# Patient Record
Sex: FEMALE | Race: BLACK OR AFRICAN AMERICAN | NOT HISPANIC OR LATINO
[De-identification: names, ages, dates, MRNs, and addresses within clinical notes are randomized per-mention and may not be internally consistent; named-entity substitution may affect disease eponyms.]

---

## 2018-09-25 ENCOUNTER — IMAGING SERVICES (OUTPATIENT)
Dept: OTHER | Age: 48
End: 2018-09-25

## 2018-09-25 ENCOUNTER — HOSPITAL (OUTPATIENT)
Dept: OTHER | Age: 48
End: 2018-09-25

## 2018-11-12 ENCOUNTER — HOSPITAL (OUTPATIENT)
Dept: OTHER | Age: 48
End: 2018-11-12

## 2021-08-12 ENCOUNTER — OFFICE VISIT (OUTPATIENT)
Dept: SURGERY | Facility: CLINIC | Age: 51
End: 2021-08-12
Payer: COMMERCIAL

## 2021-08-12 VITALS
HEIGHT: 62.6 IN | WEIGHT: 205.63 LBS | BODY MASS INDEX: 36.89 KG/M2 | SYSTOLIC BLOOD PRESSURE: 135 MMHG | OXYGEN SATURATION: 97 % | DIASTOLIC BLOOD PRESSURE: 85 MMHG | HEART RATE: 80 BPM

## 2021-08-12 DIAGNOSIS — E66.9 OBESITY (BMI 30-39.9): ICD-10-CM

## 2021-08-12 DIAGNOSIS — R63.5 WEIGHT GAIN: ICD-10-CM

## 2021-08-12 DIAGNOSIS — E06.3 HASHIMOTO'S THYROIDITIS: ICD-10-CM

## 2021-08-12 DIAGNOSIS — K59.00 CONSTIPATION, UNSPECIFIED CONSTIPATION TYPE: ICD-10-CM

## 2021-08-12 DIAGNOSIS — R73.03 PREDIABETES: Primary | ICD-10-CM

## 2021-08-12 DIAGNOSIS — E55.9 VITAMIN D DEFICIENCY: ICD-10-CM

## 2021-08-12 DIAGNOSIS — Z86.59 HISTORY OF DEPRESSION: ICD-10-CM

## 2021-08-12 DIAGNOSIS — R53.83 FATIGUE, UNSPECIFIED TYPE: ICD-10-CM

## 2021-08-12 DIAGNOSIS — R63.2 BINGE EATING: ICD-10-CM

## 2021-08-12 PROCEDURE — 3075F SYST BP GE 130 - 139MM HG: CPT | Performed by: NURSE PRACTITIONER

## 2021-08-12 PROCEDURE — 3008F BODY MASS INDEX DOCD: CPT | Performed by: NURSE PRACTITIONER

## 2021-08-12 PROCEDURE — 99204 OFFICE O/P NEW MOD 45 MIN: CPT | Performed by: NURSE PRACTITIONER

## 2021-08-12 PROCEDURE — 3079F DIAST BP 80-89 MM HG: CPT | Performed by: NURSE PRACTITIONER

## 2021-08-12 RX ORDER — ERGOCALCIFEROL 1.25 MG/1
CAPSULE ORAL
COMMUNITY
Start: 2021-08-03

## 2021-08-12 RX ORDER — LEVOTHYROXINE SODIUM 125 UG/1
TABLET ORAL
COMMUNITY
Start: 2021-07-31

## 2021-08-12 RX ORDER — HYDROCHLOROTHIAZIDE 12.5 MG/1
12.5 TABLET ORAL AS NEEDED
Qty: 30 TABLET | Refills: 1 | Status: SHIPPED | OUTPATIENT
Start: 2021-08-12 | End: 2021-08-12

## 2021-08-12 RX ORDER — HYDROCHLOROTHIAZIDE 12.5 MG/1
TABLET ORAL
Qty: 90 TABLET | Refills: 0 | Status: SHIPPED | OUTPATIENT
Start: 2021-08-12

## 2021-08-12 RX ORDER — TOPIRAMATE 25 MG/1
25 TABLET ORAL DAILY
Qty: 30 TABLET | Refills: 2 | Status: SHIPPED | OUTPATIENT
Start: 2021-08-12

## 2021-08-12 RX ORDER — LIOTHYRONINE SODIUM 5 UG/1
5 TABLET ORAL DAILY
COMMUNITY
Start: 2021-07-31

## 2021-08-12 NOTE — PATIENT INSTRUCTIONS
[facility]:  How a Doctor Cured her Autoimmune Disease with Functional Medicine  Jia Wong  Consider Gluten's role in your Hashimoto's     Fooducate    Magnesium glycinate 200 to 500 mg/day. [facility], [facility], [place], [place].     A a day with 1-2 healthy snacks. 7. Plan when, where, and what you will eat at each meal in advance to make sure you do not go too many hours without eating. 8. Include lean protein throughout the day to help steady your appetite.    9. Eat at least 4 ser

## 2021-08-12 NOTE — PROGRESS NOTES
[facility] Consultation Note       Date of Consult:  2021    Patient:  Kai Hunter  :      1970  MRN:      [id number]    Referring Provider: Patient of clinic     Chief Complaint:  Patient presents with:  Consult: Ruben Irene TABLET BY MOUTH FIRST THING IN THE MORNING 1 HOUR PRIOR TO BREAKFAST EVERY DAY     • Liothyronine Sodium 5 MCG Oral Tab Take 5 mcg by mouth daily.      • ergocalciferol 1.25 MG (86733 UT) Oral Cap TAKE 1 CAPSULE BY MOUTH 1 TIME EVERY WEEK     • HYDROCHLOROT Laterality Date   • CHOLECYSTECTOMY     • OTHER      liposuction    • REDUCTION OF LARGE BREAST     • TUBAL LIGATION         Family History:    Family History   Problem Relation Age of Onset   • Obesity Sister         s/p gastric sleeve           Typical D carotid bruit, no JVD, supple, symmetrical, trachea midline and thyroid not enlarged, symmetric, no tenderness/mass/nodules  Lungs: clear to auscultation bilaterally  Heart: S1, S2 normal, no murmur, click, rub or gallop, regular rate and rhythm  Abdomen: Future  -     TSH+FREE T4; Future  -     VITAMIN D; Future  -     VITAMIN B12; Future    Vitamin D deficiency  -     DIETITIAN EDUCATION INITIAL, DIET (INTERNAL)  -     EKG 12-LEAD; Future  -     COMP METABOLIC PANEL (14);  Future  -     LEPTIN, SERUM; Futu alcohol consumption, reduced sodium intake to no more than 2,400 mg/day, and at least 150 minutes of moderate physical activity per week. Avoid processed, poor quality carbohydrates, refined grains, flour, sugar. Goals for next month:  1.  Keep a food loss/behavioral counseling and care coordination. RTC 6 weeks.      AILEEN Biswas

## 2021-08-16 ENCOUNTER — IMAGING SERVICES (OUTPATIENT)
Dept: MAMMOGRAPHY | Age: 51
End: 2021-08-16
Attending: OBSTETRICS & GYNECOLOGY

## 2021-08-16 ENCOUNTER — IMAGING SERVICES (OUTPATIENT)
Dept: BONE DENSITY | Age: 51
End: 2021-08-16
Attending: OBSTETRICS & GYNECOLOGY

## 2021-08-16 DIAGNOSIS — Z12.31 SCREENING MAMMOGRAM, ENCOUNTER FOR: ICD-10-CM

## 2021-08-16 DIAGNOSIS — Z78.0 POSTMENOPAUSAL: ICD-10-CM

## 2021-08-16 PROCEDURE — 77080 DXA BONE DENSITY AXIAL: CPT | Performed by: RADIOLOGY

## 2021-08-16 PROCEDURE — 77067 SCR MAMMO BI INCL CAD: CPT | Performed by: RADIOLOGY

## 2021-08-16 PROCEDURE — 77063 BREAST TOMOSYNTHESIS BI: CPT | Performed by: RADIOLOGY

## 2021-08-20 ENCOUNTER — TELEPHONE (OUTPATIENT)
Dept: SURGERY | Facility: CLINIC | Age: 51
End: 2021-08-20

## 2021-08-20 NOTE — TELEPHONE ENCOUNTER
Called patient and discussed information she received from insurance regarding Dietitian services. Patient was advised Sarah Wood is showing up as Out of Network with Plan but if billed under [facility] then covered at 100%.  Advised patient bryant

## 2021-09-08 ENCOUNTER — TELEPHONE (OUTPATIENT)
Dept: SURGERY | Facility: CLINIC | Age: 51
End: 2021-09-08

## 2021-09-08 NOTE — TELEPHONE ENCOUNTER
Called patient as follow up to previous conversation regarding In Network status of Nancy Menezes RD. Informed patient hat per [facility] [employer] all paperwork was submitted to [facility] for [facility] on 8/13/21. If claim is denied, On license of [facility] will resubmit.

## 2021-09-09 ENCOUNTER — OFFICE VISIT (OUTPATIENT)
Dept: SURGERY | Facility: CLINIC | Age: 51
End: 2021-09-09
Payer: COMMERCIAL

## 2021-09-09 VITALS — WEIGHT: 201.5 LBS | HEIGHT: 62 IN | BODY MASS INDEX: 37.08 KG/M2

## 2021-09-09 DIAGNOSIS — E66.9 OBESITY (BMI 30-39.9): ICD-10-CM

## 2021-09-09 DIAGNOSIS — Z86.59 HISTORY OF DEPRESSION: ICD-10-CM

## 2021-09-09 DIAGNOSIS — E66.01 CLASS 2 SEVERE OBESITY DUE TO EXCESS CALORIES WITH SERIOUS COMORBIDITY AND BODY MASS INDEX (BMI) OF 36.0 TO 36.9 IN ADULT (HCC): Primary | ICD-10-CM

## 2021-09-09 DIAGNOSIS — R73.03 PREDIABETES: ICD-10-CM

## 2021-09-09 DIAGNOSIS — E55.9 VITAMIN D DEFICIENCY: ICD-10-CM

## 2021-09-09 DIAGNOSIS — R63.5 WEIGHT GAIN: ICD-10-CM

## 2021-09-09 DIAGNOSIS — R63.2 BINGE EATING: ICD-10-CM

## 2021-09-09 DIAGNOSIS — E06.3 HASHIMOTO'S THYROIDITIS: ICD-10-CM

## 2021-09-09 PROCEDURE — 97802 MEDICAL NUTRITION INDIV IN: CPT

## 2021-09-09 PROCEDURE — 3008F BODY MASS INDEX DOCD: CPT

## 2021-09-09 NOTE — PATIENT INSTRUCTIONS
Goals: 1. Track your food intake, using either paper, or food application only need 3-5 days a week x 3-4 weeks. 2. Aim for high protein >60 g protein a day, aim for 64 oz of water  3. Try premier protein oats, ensure max.    4. Continue to be active, A

## 2021-09-09 NOTE — PROGRESS NOTES
[facility]  [address] [place] [id number]  Dept: [phone]  Loc: [phone]    09/09/21    Bariatric Initial Nutrition Assessment    Anastasia Michel is a 46year old female.     Referr Readings from Last 6 Encounters:  09/09/21 : 201 lb 8 oz (91.4 kg)  08/12/21 : 205 lb 9.6 oz (93.3 kg)      IBW:  Ideal body weight: 50.1 kg (110 lb 7.2 oz)  Adjusted ideal body weight: 66.6 kg (146 lb 13.9 oz)    BMI: Body mass index is 36.15 kg/m².  Abhijit Vu Rfl: 0    Vitamins/Minerals: Iron and Vit D  Food Intolerances: Gluten--pasta/pizza causing stomach issues, dairy issues as well. Food Allergies:  NKFA  Smoker:     Smoking status: Never Smoker   Smokeless tobacco: Never Used       Alcohol Intake:      Al for high protein >60 g protein a day, aim for 64 oz of water  3. Try premier protein oats, ensure max. 4. Continue to be active, Aim for 100 mins week. 5. Be attentive to hunger cues, eating strategies  6.  Surround yourself with healthy snack options,

## 2021-09-15 ENCOUNTER — TELEPHONE (OUTPATIENT)
Dept: SURGERY | Facility: CLINIC | Age: 51
End: 2021-09-15

## 2021-09-15 NOTE — TELEPHONE ENCOUNTER
Patient would like to discuss medication,states that vyvanse is keeping her up and did not start topiramate. Has appointment  October 1st,would like to know also if she should reschedule and try something new

## 2021-09-16 RX ORDER — NALTREXONE HYDROCHLORIDE AND BUPROPION HYDROCHLORIDE 8; 90 MG/1; MG/1
TABLET, EXTENDED RELEASE ORAL
Qty: 120 TABLET | Refills: 0 | Status: SHIPPED | OUTPATIENT
Start: 2021-09-16

## 2021-09-16 NOTE — TELEPHONE ENCOUNTER
Spoke to patient. Describes difficulty sleeping on Vyvanse. Current dose 20 mg/day. States Vyvanse controls appetite, has lost 6 lbs. She has not started topiramate. Discussed options at length.  For now, patient will add topiramate to the evening around [facility]

## 2022-12-26 ENCOUNTER — APPOINTMENT (OUTPATIENT)
Dept: MAMMOGRAPHY | Age: 52
End: 2022-12-26
Attending: OBSTETRICS & GYNECOLOGY

## 2023-07-17 DIAGNOSIS — Z78.0 MENOPAUSE: Primary | ICD-10-CM

## 2023-07-17 DIAGNOSIS — E78.5 HYPERLIPEMIA: ICD-10-CM

## 2023-08-17 ENCOUNTER — IMAGING SERVICES (OUTPATIENT)
Dept: BONE DENSITY | Age: 53
End: 2023-08-17
Attending: OBSTETRICS & GYNECOLOGY

## 2023-08-17 ENCOUNTER — IMAGING SERVICES (OUTPATIENT)
Dept: MAMMOGRAPHY | Age: 53
End: 2023-08-17
Attending: OBSTETRICS & GYNECOLOGY

## 2023-08-17 ENCOUNTER — IMAGING SERVICES (OUTPATIENT)
Dept: CT IMAGING | Age: 53
End: 2023-08-17
Attending: OBSTETRICS & GYNECOLOGY

## 2023-08-17 DIAGNOSIS — Z12.39 ENCOUNTER FOR SCREENING FOR MALIGNANT NEOPLASM OF BREAST: ICD-10-CM

## 2023-08-17 DIAGNOSIS — E78.5 HYPERLIPEMIA: ICD-10-CM

## 2023-08-17 DIAGNOSIS — Z13.9 SCREENING DUE: ICD-10-CM

## 2023-08-17 DIAGNOSIS — Z78.0 MENOPAUSE: ICD-10-CM

## 2023-08-17 PROCEDURE — 77080 DXA BONE DENSITY AXIAL: CPT | Performed by: RADIOLOGY

## 2023-08-17 PROCEDURE — 77063 BREAST TOMOSYNTHESIS BI: CPT | Performed by: RADIOLOGY

## 2023-08-17 PROCEDURE — 75571 CT HRT W/O DYE W/CA TEST: CPT | Performed by: INTERNAL MEDICINE

## 2023-08-17 PROCEDURE — 77067 SCR MAMMO BI INCL CAD: CPT | Performed by: RADIOLOGY

## 2023-08-25 ENCOUNTER — TELEPHONE (OUTPATIENT)
Dept: CARDIOLOGY | Age: 53
End: 2023-08-25

## 2024-05-11 ENCOUNTER — LAB SERVICES (OUTPATIENT)
Dept: LAB | Age: 54
End: 2024-05-11

## 2024-05-11 DIAGNOSIS — Z01.818 PREOP EXAMINATION: Primary | ICD-10-CM

## 2024-05-11 PROCEDURE — 85730 THROMBOPLASTIN TIME PARTIAL: CPT | Performed by: CLINICAL MEDICAL LABORATORY

## 2024-05-11 PROCEDURE — 85610 PROTHROMBIN TIME: CPT | Performed by: CLINICAL MEDICAL LABORATORY

## 2024-05-11 PROCEDURE — 36415 COLL VENOUS BLD VENIPUNCTURE: CPT | Performed by: INTERNAL MEDICINE

## 2024-05-12 LAB
APTT PPP: 27 SEC (ref 22–32)
INR PPP: 1
PROTHROMBIN TIME: 10.5 SEC (ref 9.7–11.8)

## 2024-05-18 ENCOUNTER — APPOINTMENT (OUTPATIENT)
Dept: GENERAL RADIOLOGY | Age: 54
End: 2024-05-18
Attending: OBSTETRICS & GYNECOLOGY

## 2024-05-18 ENCOUNTER — APPOINTMENT (OUTPATIENT)
Dept: CARDIOLOGY | Age: 54
End: 2024-05-18
Attending: OBSTETRICS & GYNECOLOGY

## 2024-05-18 DIAGNOSIS — Z01.818 PREOP EXAMINATION: ICD-10-CM

## 2024-05-18 PROCEDURE — 93000 ELECTROCARDIOGRAM COMPLETE: CPT | Performed by: INTERNAL MEDICINE

## 2024-05-18 PROCEDURE — 71046 X-RAY EXAM CHEST 2 VIEWS: CPT | Performed by: RADIOLOGY

## 2025-01-27 DIAGNOSIS — Z12.31 VISIT FOR SCREENING MAMMOGRAM: Primary | ICD-10-CM

## 2025-03-17 ENCOUNTER — APPOINTMENT (OUTPATIENT)
Age: 55
End: 2025-03-17
Attending: OBSTETRICS & GYNECOLOGY

## 2025-03-17 DIAGNOSIS — Z12.31 VISIT FOR SCREENING MAMMOGRAM: ICD-10-CM

## 2025-03-17 PROCEDURE — 77063 BREAST TOMOSYNTHESIS BI: CPT
